# Patient Record
Sex: FEMALE | Race: WHITE | NOT HISPANIC OR LATINO | Employment: FULL TIME | URBAN - METROPOLITAN AREA
[De-identification: names, ages, dates, MRNs, and addresses within clinical notes are randomized per-mention and may not be internally consistent; named-entity substitution may affect disease eponyms.]

---

## 2021-08-31 PROCEDURE — U0004 COV-19 TEST NON-CDC HGH THRU: HCPCS | Performed by: FAMILY MEDICINE

## 2022-02-01 ENCOUNTER — OFFICE VISIT (OUTPATIENT)
Dept: OBSTETRICS AND GYNECOLOGY | Facility: CLINIC | Age: 27
End: 2022-02-01

## 2022-02-01 VITALS — DIASTOLIC BLOOD PRESSURE: 80 MMHG | SYSTOLIC BLOOD PRESSURE: 138 MMHG | WEIGHT: 262 LBS

## 2022-02-01 DIAGNOSIS — N90.89 VULVAR LESION: Primary | ICD-10-CM

## 2022-02-01 PROCEDURE — 99212 OFFICE O/P EST SF 10 MIN: CPT | Performed by: NURSE PRACTITIONER

## 2022-02-01 NOTE — PROGRESS NOTES
.[facility]       Gynecologic Exam Note      Chief Complaint   Patient presents with   • lump on vulva       Subjective   HPI  Sandra Isaacs is a 26 y.o. female, No obstetric history on file., who presents for Lump in vulva area.      She states she has experienced this problem for 3 days.  She describes the severity as mild.  She states that the problem is annoying.  The patient reports additional symptoms as none.      Her last LMP was 01/01/2022.  Periods are irregular, lasting 5 days.  Dysmenorrhea:moderate, occurring first 1-2 days of flow.  Patient reports problems with: none.  Partner Status: Marital Status: single.  New Partners since last visit: no.  Desires STD Screening: no.    Additional OB/GYN History   Current contraception: contraceptive methods: None  Desires to: do not start contraception  Last Pap : 4 years ago neg  Last Completed Pap Smear     This patient has no relevant Health Maintenance data.        History of abnormal Pap smear: no  Last mammogram:   Last Completed Mammogram     This patient has no relevant Health Maintenance data.        Tobacco Usage?: No   OB History    No obstetric history on file.         Health Maintenance   Topic Date Due   • Annual Gynecologic Pelvic and Breast Exam  Never done   • ANNUAL PHYSICAL  Never done   • COVID-19 Vaccine (1) Never done   • HPV VACCINES (1 - 2-dose series) Never done   • TDAP/TD VACCINES (1 - Tdap) Never done   • INFLUENZA VACCINE  Never done   • HEPATITIS C SCREENING  Never done   • CHLAMYDIA SCREENING  Never done   • PAP SMEAR  Never done   • Pneumococcal Vaccine 0-64  Aged Out       The additional following portions of the patient's history were reviewed and updated as appropriate: allergies, current medications, past family history, past medical history, past social history, past surgical history and problem list.    Review of Systems   Constitutional: Negative.    Cardiovascular: Negative.    Gastrointestinal: Negative.    Genitourinary:  Positive for genital sores.       I have reviewed and agree with the HPI, ROS, and historical information as entered above. AYAAN Espinoza    Objective   /80   Wt 119 kg (262 lb)   LMP 01/01/2022     Physical Exam  Vitals and nursing note reviewed. Exam conducted with a chaperone present.   Constitutional:       Appearance: Normal appearance. She is obese.   Abdominal:      Palpations: Abdomen is soft.      Tenderness: There is no abdominal tenderness.   Genitourinary:     Labia:         Right: No rash, tenderness or lesion.         Left: Lesion present. No rash or tenderness.       Comments: Approx 1cm left labia sebaceous cyst  Neurological:      Mental Status: She is alert.         Assessment/Plan     Assessment     Problem List Items Addressed This Visit     None      vulvar lesion  Sebaceous cyst      Plan     Approx 1cm left labia sebaceous cyst present. No treatment indicated.   2.   If increased in size or becomes tender, can do warm sitz baths      AYAAN Espinoza  02/01/2022